# Patient Record
(demographics unavailable — no encounter records)

---

## 2025-03-18 NOTE — ASSESSMENT
[FreeTextEntry1] : T2DM Need more data at present -[facility] today and personal cgm moving forward -Blood work today -Continue Jardiance 10mg qd in light of CHF -Continue Metformin 1000mg qd for now -Begin mounjaro 2.5mg qwk in light of obesity, FH of Cardiac disease, personal hx of chf. R&B discussed, demo complete, sample provided first injection administered in office. -Food journal  -Continue Famotidine as prescribed for GERD symptoms,  -Begin Metamucil fiber supplement with tall glass of water each morning -Would benefit greatly from mild strength training.   RTC 2-weeks with MARTINEZ Mai, and 4-weeks with Me.       Diabetes Counseling: The patient was counseled on diabetes foot care, long term vascular complications of diabetes, carbohydrate consistent diet, importance of diet and exercise to improve glycemic control, achieve weight loss and improve cardiovascular health, exercise/effect on glucose, hypoglycemia management, glucagon use, ketone testing, action and use of short and long-acting insulin, self-monitoring of blood glucose, insulin self-administration, injection technique, storage, and sharps disposal and sick-day management.  Patient was referred to ophthalmology for retinopathy screening. Risks and benefits of diabetic medications were discussed.

## 2025-03-18 NOTE — HISTORY OF PRESENT ILLNESS
[FreeTextEntry1] : HISTORY OF PRESENT ILLNESS ***Mar. 18, 2025*** Cardiologist Dr. Misty Jimenez, NP Ebony Serrano  PCP Dr. Atul Hernández Mr. MAYORGA was diagnosed with Diabetes Mellitus Type 2 in 2024 and arrives today for initial evaluation of glycemic control.   He reports history of CHF, HTN, dyslipidemia, and denies CAD, known complications of retinopathy, nephropathy, or neuropathy. He is presently on Spironolactone 25mg qd, Atorvastatin 40mg qhs, Jardiance 10mg qd, Ramipril 1.25mg qd, Carvedilol 3.125mg bid,  Famotidine 20mg qd, Metformin 1000mg qd, Ozempic 0.25mg qwk He endorses recent weight gain of 60lbs in less than one year.     Blood sugars are checked once weekly  Pt denies pancreatitis, denies constipation, Personal or FH of medullary thyroid Ca Hypoglycemia frequency: Pt denies subjective HYPOs  He denies reports maternal hx of cad  at the age of 62. Fingerstick glucose in the office today is  mg/dL  fasting hours after eating.   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

## 2025-06-02 NOTE — ASSESSMENT
[FreeTextEntry1] : T2DM -Blood work today -Continue Jardiance 10mg qd in light of CHF -Discontinue Metformin 1000mg qd for now -Increase mounjaro 5mg qwk in light of obesity, FH of Cardiac disease, personal hx of chf.  -Continue Famotidine as prescribed for GERD symptoms,  -Metamucil fiber supplement with tall glass of water each morning -Would benefit greatly from mild strength training.   RTC 3 months   Diabetes Counseling: The patient was counseled on diabetes foot care, long term vascular complications of diabetes, carbohydrate consistent diet, importance of diet and exercise to improve glycemic control, achieve weight loss and improve cardiovascular health, exercise/effect on glucose, hypoglycemia management, glucagon use, ketone testing, action and use of short and long-acting insulin, self-monitoring of blood glucose, insulin self-administration, injection technique, storage, and sharps disposal and sick-day management.  Patient was referred to ophthalmology for retinopathy screening. Risks and benefits of diabetic medications were discussed.

## 2025-06-02 NOTE — HISTORY OF PRESENT ILLNESS
[FreeTextEntry1] : ***JUNE 2, 2025*** Aneesh feels well overall denies new complaints. He reports pharmacy stock consistently falls short at mounjaro refill time leading to him missing one week every month.  HE is presently on mounjaro 2.5mg qwk Metformin 1000mg qd, Jardiance 10 mg qd. bg in office is 143 mg/dL  HISTORY OF PRESENT ILLNESS ***Mar. 18, 2025*** Cardiologist Dr. Misty Jimenez, NP Ebony Serrano  PCP Dr. Atul Hernández Mr. MAYORGA was diagnosed with Diabetes Mellitus Type 2 in 2024 and arrives today for initial evaluation of glycemic control.   He reports history of CHF, HTN, dyslipidemia, and denies CAD, known complications of retinopathy, nephropathy, or neuropathy. He is presently on Spironolactone 25mg qd, Atorvastatin 40mg qhs, Jardiance 10mg qd, Ramipril 1.25mg qd, Carvedilol 3.125mg bid,  Famotidine 20mg qd, Metformin 1000mg qd, Ozempic 0.25mg qwk He endorses recent weight gain of 60lbs in less than one year.     Blood sugars are checked once weekly  Pt denies pancreatitis, denies constipation, Personal or FH of medullary thyroid Ca Hypoglycemia frequency: Pt denies subjective HYPOs  He denies reports maternal hx of cad  at the age of 62. Fingerstick glucose in the office today is  mg/dL  fasting hours after eating.   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